# Patient Record
Sex: MALE | Race: WHITE | NOT HISPANIC OR LATINO | Employment: OTHER | URBAN - METROPOLITAN AREA
[De-identification: names, ages, dates, MRNs, and addresses within clinical notes are randomized per-mention and may not be internally consistent; named-entity substitution may affect disease eponyms.]

---

## 2024-06-28 ENCOUNTER — OFFICE VISIT (OUTPATIENT)
Dept: URGENT CARE | Facility: URGENT CARE | Age: 89
End: 2024-06-28
Payer: COMMERCIAL

## 2024-06-28 VITALS
WEIGHT: 174.4 LBS | SYSTOLIC BLOOD PRESSURE: 125 MMHG | OXYGEN SATURATION: 98 % | DIASTOLIC BLOOD PRESSURE: 61 MMHG | HEART RATE: 90 BPM | TEMPERATURE: 98.4 F

## 2024-06-28 DIAGNOSIS — U07.1 COVID-19: Primary | ICD-10-CM

## 2024-06-28 PROBLEM — S72.91XA FEMUR FRACTURE, RIGHT (H): Status: ACTIVE | Noted: 2021-01-29

## 2024-06-28 PROBLEM — D63.0 ANEMIA IN NEOPLASTIC DISEASE: Status: ACTIVE | Noted: 2021-02-02

## 2024-06-28 PROBLEM — N13.9 OBSTRUCTIVE AND REFLUX UROPATHY, UNSPECIFIED: Status: ACTIVE | Noted: 2021-02-02

## 2024-06-28 PROBLEM — E78.5 HYPERLIPIDEMIA, UNSPECIFIED: Status: ACTIVE | Noted: 2021-02-02

## 2024-06-28 PROBLEM — Z87.891 PERSONAL HISTORY OF NICOTINE DEPENDENCE: Status: ACTIVE | Noted: 2021-02-02

## 2024-06-28 PROBLEM — C79.9 METASTASIS FROM BLADDER CANCER (H): Status: ACTIVE | Noted: 2020-05-06

## 2024-06-28 PROBLEM — K59.09 INTERMITTENT CONSTIPATION: Status: ACTIVE | Noted: 2020-07-02

## 2024-06-28 PROBLEM — D64.9 ANEMIA REQUIRING TRANSFUSIONS: Status: ACTIVE | Noted: 2020-07-02

## 2024-06-28 PROBLEM — M62.81 MUSCLE WEAKNESS (GENERALIZED): Status: ACTIVE | Noted: 2021-02-02

## 2024-06-28 PROBLEM — S72.401D: Status: ACTIVE | Noted: 2021-02-02

## 2024-06-28 PROBLEM — R31.0 FRANK HEMATURIA: Status: ACTIVE | Noted: 2019-04-23

## 2024-06-28 PROBLEM — E78.5 DYSLIPIDEMIA: Chronic | Status: ACTIVE | Noted: 2024-06-28

## 2024-06-28 PROBLEM — C79.11: Status: ACTIVE | Noted: 2021-02-02

## 2024-06-28 PROBLEM — D69.59 CHEMOTHERAPY-INDUCED THROMBOCYTOPENIA: Status: ACTIVE | Noted: 2020-07-02

## 2024-06-28 PROBLEM — C67.9 MALIGNANT NEOPLASM OF BLADDER, UNSPECIFIED (H): Status: ACTIVE | Noted: 2021-02-02

## 2024-06-28 PROBLEM — K62.5 BRIGHT RED RECTAL BLEEDING: Status: ACTIVE | Noted: 2020-07-02

## 2024-06-28 PROBLEM — D63.8 ANEMIA IN OTHER CHRONIC DISEASES CLASSIFIED ELSEWHERE: Status: ACTIVE | Noted: 2023-01-19

## 2024-06-28 PROBLEM — D61.810 PANCYTOPENIA DUE TO ANTINEOPLASTIC CHEMOTHERAPY (H): Status: ACTIVE | Noted: 2020-07-02

## 2024-06-28 PROBLEM — D49.59: Status: ACTIVE | Noted: 2019-07-01

## 2024-06-28 PROBLEM — K64.4 BLEEDING EXTERNAL HEMORRHOIDS: Status: ACTIVE | Noted: 2020-07-02

## 2024-06-28 PROBLEM — T45.1X5A CHEMOTHERAPY-INDUCED THROMBOCYTOPENIA: Status: ACTIVE | Noted: 2020-07-02

## 2024-06-28 PROBLEM — C67.9 METASTASIS FROM BLADDER CANCER (H): Status: ACTIVE | Noted: 2020-05-06

## 2024-06-28 PROBLEM — Z91.81 HISTORY OF FALLING: Status: ACTIVE | Noted: 2021-02-02
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
## 2024-06-28 PROBLEM — Z79.899 OTHER LONG TERM (CURRENT) DRUG THERAPY: Status: ACTIVE | Noted: 2021-02-02

## 2024-06-28 PROBLEM — T45.1X5A PANCYTOPENIA DUE TO ANTINEOPLASTIC CHEMOTHERAPY (H): Status: ACTIVE | Noted: 2020-07-02

## 2024-06-28 PROBLEM — D62 ANEMIA ASSOCIATED WITH ACUTE BLOOD LOSS: Status: ACTIVE | Noted: 2020-07-02

## 2024-06-28 PROBLEM — Z90.5 HISTORY OF LEFT NEPHRECTOMY: Status: ACTIVE | Noted: 2020-07-02

## 2024-06-28 PROBLEM — Z90.79 HISTORY OF PROSTATECTOMY: Status: ACTIVE | Noted: 2020-07-02

## 2024-06-28 PROCEDURE — 99204 OFFICE O/P NEW MOD 45 MIN: CPT | Performed by: STUDENT IN AN ORGANIZED HEALTH CARE EDUCATION/TRAINING PROGRAM

## 2024-06-28 RX ORDER — AMOXICILLIN 250 MG
1 CAPSULE ORAL 2 TIMES DAILY PRN
COMMUNITY

## 2024-06-28 NOTE — PROGRESS NOTES
ASSESSMENT & PLAN:   Diagnoses and all orders for this visit:  COVID-19  -     nirmatrelvir and ritonavir (PAXLOVID) 150 mg/100 mg therapy pack; Take 2 tablets by mouth 2 times daily for 5 days    URI symptoms x 2 days with COVID exposure. At home COVID test positive this morning. He is high risk due to age, chronic kidney disease, metastatic bladder cancer. BMP in 06/2024 ([facility]) - GFR 45. Start Paxlovid at renal dosing. Abortive cares discussed including rest, fluids, Tylenol/ibuprofen.    At the end of the encounter, I discussed results, diagnosis, medications. Discussed red flags for immediate return to clinic/ER, as well as indications for follow up if no improvement. Patient and/or caregiver understood and agreed to plan. Patient was stable for discharge.    There are no Patient Instructions on file for this visit.    No follow-ups on file.    ------------------------------------------------------------------------  SUBJECTIVE  History was obtained from patient.    Patient presents with:  Urgent Care  URI: Runny nose, cough, sneezing, had a little stuffy on Wednesday, about 12 o'clock start coughing and sneezing, did the home test today and positive    HPI  Alen Oconnor is a(n) 89 year old male presenting to urgent care for URI symptoms x 2 days. Reports congestion, rhinorrhea, mild cough. Symptoms  slightly improved last night, but returned again today. COVID exposure. At home COVID test positive today.    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  2021-02: Unspecified fracture of lower end of right femur, subsequent   encounter for closed fracture with routine healing  2021-02: Personal history of nicotine dependence  2021-02: Obstructive and reflux uropathy, unspecified  2021-02: Muscle weakness (generalized)  2021-02: Secondary malignant neoplasm of bladder (H)  2021-02: Malignant neoplasm of bladder, unspecified (H)  2021-02: Hyperlipidemia, unspecified  2021-02: History of falling  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  2021-02: Other long term (current) drug therapy  2021-01: Fall  2021-01: Femur fracture, right (H)  2020-07: Pancytopenia due to antineoplastic chemotherapy (H24)  2020-07: Intermittent constipation  2020-07: History of prostatectomy  2020-07: History of left nephrectomy  2020-07: Chemotherapy-induced thrombocytopenia  2020-07: Bright red rectal bleeding  2020-07: Bleeding external hemorrhoids  2020-07: Anemia requiring transfusions  2020-07: Anemia associated with acute blood loss  2020-05: Metastasis from bladder cancer (H)  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   PSYCH: mentation appears normal. Normal affect  HEAD: normocephalic, atraumatic.  EYE: PERRL. EOMs intact. No scleral injection bilaterally.   EAR: external ear normal. Bilateral ear canals normal and nonpainful. Bilateral TM intact, pearly, translucent without bulging.  NOSE: external nose atraumatic without lesions.  OROPHARYNX: moist mucous  membranes. Posterior oropharynx without erythema or exudate. Uvula midline. Patent airway.  LUNGS: no increased work of breathing. Clear lung sounds bilaterally. No wheezing, rhonchi, or rales.   CV: regular rate and rhythm. No clicks, murmurs, or rubs.    No results found for any visits on 06/28/24.